# Patient Record
Sex: FEMALE | Race: WHITE | URBAN - METROPOLITAN AREA
[De-identification: names, ages, dates, MRNs, and addresses within clinical notes are randomized per-mention and may not be internally consistent; named-entity substitution may affect disease eponyms.]

---

## 2017-05-09 ENCOUNTER — IMPORTED ENCOUNTER (OUTPATIENT)
Dept: URBAN - METROPOLITAN AREA CLINIC 50 | Facility: CLINIC | Age: 82
End: 2017-05-09

## 2017-05-18 ENCOUNTER — IMPORTED ENCOUNTER (OUTPATIENT)
Dept: URBAN - METROPOLITAN AREA CLINIC 50 | Facility: CLINIC | Age: 82
End: 2017-05-18

## 2017-11-16 ENCOUNTER — IMPORTED ENCOUNTER (OUTPATIENT)
Dept: URBAN - METROPOLITAN AREA CLINIC 50 | Facility: CLINIC | Age: 82
End: 2017-11-16

## 2018-05-17 ENCOUNTER — IMPORTED ENCOUNTER (OUTPATIENT)
Dept: URBAN - METROPOLITAN AREA CLINIC 50 | Facility: CLINIC | Age: 83
End: 2018-05-17

## 2018-11-29 ENCOUNTER — IMPORTED ENCOUNTER (OUTPATIENT)
Dept: URBAN - METROPOLITAN AREA CLINIC 50 | Facility: CLINIC | Age: 83
End: 2018-11-29

## 2021-01-21 NOTE — PATIENT DISCUSSION
Follow closely without drops; IOP still slightly elevated. VF today shows possible damage but is very unreliable. Will repeat at next visit.

## 2021-04-17 ASSESSMENT — VISUAL ACUITY
OS_CC: J1+
OD_SC: 20/25
OD_SC: 20/25
OS_SC: 20/30
OD_SC: 20/25
OS_SC: 20/25
OS_SC: 20/25
OD_CC: J1+
OS_SC: 20/25
OD_SC: 20/25

## 2021-04-17 ASSESSMENT — PACHYMETRY
OD_CT_UM: 600
OS_CT_UM: 581
OD_CT_UM: 600
OD_CT_UM: 600
OS_CT_UM: 581
OS_CT_UM: 581
OD_CT_UM: 600
OS_CT_UM: 581

## 2021-04-17 ASSESSMENT — TONOMETRY
OS_IOP_MMHG: 13
OD_IOP_MMHG: 13
OS_IOP_MMHG: 14
OS_IOP_MMHG: 11
OS_IOP_MMHG: 12
OD_IOP_MMHG: 13
OD_IOP_MMHG: 13
OS_IOP_MMHG: 9
OD_IOP_MMHG: 11
OS_IOP_MMHG: 13
OD_IOP_MMHG: 14
OD_IOP_MMHG: 16
OS_IOP_MMHG: 11
OS_IOP_MMHG: 11
OD_IOP_MMHG: 10
OD_IOP_MMHG: 10